# Patient Record
Sex: FEMALE | Race: WHITE | Employment: PART TIME | URBAN - METROPOLITAN AREA
[De-identification: names, ages, dates, MRNs, and addresses within clinical notes are randomized per-mention and may not be internally consistent; named-entity substitution may affect disease eponyms.]

---

## 2023-02-15 ENCOUNTER — HOSPITAL ENCOUNTER (EMERGENCY)
Age: 18
Discharge: HOME | End: 2023-02-16
Attending: EMERGENCY MEDICINE

## 2023-02-15 DIAGNOSIS — R07.82 INTERCOSTAL PAIN: Primary | ICD-10-CM

## 2023-02-15 LAB
ALBUMIN SERPL-MCNC: 3.6 G/DL (ref 3.5–5.2)
ALBUMIN/GLOBULIN RATIO: 1.2 (ref 1–2.5)
ALP SERPL-CCNC: 62 U/L (ref 35–104)
ALT SERPL-CCNC: 8 U/L (ref 5–33)
ANION GAP SERPL CALCULATED.3IONS-SCNC: 11 MMOL/L (ref 9–17)
AST SERPL-CCNC: 15 U/L
BILIRUB SERPL-MCNC: <0.1 MG/DL (ref 0.3–1.2)
BILIRUBIN URINE: NEGATIVE
BUN SERPL-MCNC: 8 MG/DL (ref 6–20)
CALCIUM SERPL-MCNC: 8.9 MG/DL (ref 8.6–10.4)
CASTS UA: NORMAL /LPF (ref 0–8)
CHLORIDE SERPL-SCNC: 102 MMOL/L (ref 98–107)
CO2 SERPL-SCNC: 22 MMOL/L (ref 20–31)
COLOR: YELLOW
CREAT SERPL-MCNC: 0.52 MG/DL (ref 0.5–0.9)
D DIMER BLD IA.RAPID-MCNC: 0.9 MG/L FEU
EPITHELIAL CELLS UA: NORMAL /HPF (ref 0–5)
FLUAV AG SPEC QL: NEGATIVE
FLUBV AG SPEC QL: NEGATIVE
GFR SERPL CREATININE-BSD FRML MDRD: >60 ML/MIN/1.73M2
GLUCOSE SERPL-MCNC: 66 MG/DL (ref 70–99)
GLUCOSE UR STRIP.AUTO-MCNC: NEGATIVE MG/DL
HCT VFR BLD AUTO: 34.2 % (ref 36.3–47.1)
HGB BLD-MCNC: 11.1 G/DL (ref 11.9–15.1)
KETONES UR STRIP.AUTO-MCNC: NEGATIVE MG/DL
LEUKOCYTE ESTERASE UR QL STRIP.AUTO: ABNORMAL
LIPASE SERPL-CCNC: 25 U/L (ref 13–60)
MAGNESIUM SERPL-MCNC: 1.9 MG/DL (ref 1.7–2.2)
MCH RBC QN AUTO: 30.7 PG (ref 25.2–33.5)
MCHC RBC AUTO-ENTMCNC: 32.5 G/DL (ref 28.4–34.8)
MCV RBC AUTO: 94.5 FL (ref 82.6–102.9)
NITRITE UR QL STRIP.AUTO: NEGATIVE
NRBC AUTOMATED: 0 PER 100 WBC
PDW BLD-RTO: 13.9 % (ref 11.8–14.4)
PLATELET # BLD AUTO: 349 K/UL (ref 138–453)
PMV BLD AUTO: 9.1 FL (ref 8.1–13.5)
POTASSIUM SERPL-SCNC: 3.6 MMOL/L (ref 3.7–5.3)
PROT SERPL-MCNC: 6.6 G/DL (ref 6.4–8.3)
PROT UR STRIP.AUTO-MCNC: 6.5 MG/DL (ref 5–8)
PROT UR STRIP.AUTO-MCNC: NEGATIVE MG/DL
RBC # BLD: 3.62 M/UL (ref 3.95–5.11)
RBC CLUMPS #/AREA URNS AUTO: NORMAL /HPF (ref 0–4)
SARS-COV-2 RDRP RESP QL NAA+PROBE: NOT DETECTED
SODIUM SERPL-SCNC: 135 MMOL/L (ref 135–144)
SPECIFIC GRAVITY UA: 1.01 (ref 1–1.03)
SPECIMEN DESCRIPTION: NORMAL
TROPONIN I SERPL DL<=0.01 NG/ML-MCNC: <6 NG/L (ref 0–14)
TURBIDITY: CLEAR
URINE HGB: NEGATIVE
UROBILINOGEN, URINE: NORMAL
WBC # BLD AUTO: 12.6 K/UL (ref 4.5–13.5)
WBC UA: NORMAL /HPF (ref 0–5)

## 2023-02-15 PROCEDURE — 87804 INFLUENZA ASSAY W/OPTIC: CPT

## 2023-02-15 PROCEDURE — 87086 URINE CULTURE/COLONY COUNT: CPT

## 2023-02-15 PROCEDURE — 83735 ASSAY OF MAGNESIUM: CPT

## 2023-02-15 PROCEDURE — 85379 FIBRIN DEGRADATION QUANT: CPT

## 2023-02-15 PROCEDURE — 87635 SARS-COV-2 COVID-19 AMP PRB: CPT

## 2023-02-15 PROCEDURE — 85027 COMPLETE CBC AUTOMATED: CPT

## 2023-02-15 PROCEDURE — 80053 COMPREHEN METABOLIC PANEL: CPT

## 2023-02-15 PROCEDURE — 99284 EMERGENCY DEPT VISIT MOD MDM: CPT

## 2023-02-15 PROCEDURE — 81001 URINALYSIS AUTO W/SCOPE: CPT

## 2023-02-15 PROCEDURE — 84484 ASSAY OF TROPONIN QUANT: CPT

## 2023-02-15 PROCEDURE — 83690 ASSAY OF LIPASE: CPT

## 2023-02-15 PROCEDURE — 93005 ELECTROCARDIOGRAM TRACING: CPT

## 2023-02-15 ASSESSMENT — PAIN SCALES - GENERAL: PAINLEVEL_OUTOF10: 5

## 2023-02-15 ASSESSMENT — ENCOUNTER SYMPTOMS
CHEST TIGHTNESS: 0
COUGH: 1
ABDOMINAL PAIN: 0
SORE THROAT: 0
VOMITING: 0
DIARRHEA: 0
NAUSEA: 0
SHORTNESS OF BREATH: 0

## 2023-02-15 ASSESSMENT — PAIN - FUNCTIONAL ASSESSMENT: PAIN_FUNCTIONAL_ASSESSMENT: 0-10

## 2023-02-15 ASSESSMENT — PAIN DESCRIPTION - LOCATION: LOCATION: CHEST

## 2023-02-16 VITALS
HEART RATE: 73 BPM | SYSTOLIC BLOOD PRESSURE: 91 MMHG | RESPIRATION RATE: 17 BRPM | HEIGHT: 67 IN | TEMPERATURE: 97 F | DIASTOLIC BLOOD PRESSURE: 73 MMHG | OXYGEN SATURATION: 99 %

## 2023-02-16 LAB
EKG ATRIAL RATE: 85 BPM
EKG P AXIS: 19 DEGREES
EKG P-R INTERVAL: 144 MS
EKG Q-T INTERVAL: 374 MS
EKG QRS DURATION: 68 MS
EKG QTC CALCULATION (BAZETT): 445 MS
EKG R AXIS: 16 DEGREES
EKG T AXIS: 20 DEGREES
EKG VENTRICULAR RATE: 85 BPM
MICROORGANISM SPEC CULT: NORMAL
SPECIMEN DESCRIPTION: NORMAL

## 2023-02-16 PROCEDURE — 93010 ELECTROCARDIOGRAM REPORT: CPT | Performed by: INTERNAL MEDICINE

## 2023-02-16 NOTE — ED PROVIDER NOTES
[facility]  Emergency Department  Faculty Attestation     I performed a history and physical examination of the patient and discussed management with the resident. I reviewed the residents note and agree with the documented findings and plan of care. Any areas of disagreement are noted on the chart. I was personally present for the key portions of any procedures. I have documented in the chart those procedures where I was not present during the key portions. I have reviewed the emergency nurses triage note. I agree with the chief complaint, past medical history, past surgical history, allergies, medications, social and family history as documented unless otherwise noted below. For Physician Assistant/ Nurse Practitioner cases/documentation I have personally evaluated this patient and have completed at least one if not all key elements of the E/M (history, physical exam, and MDM). Additional findings are as noted.       Primary Care Physician:  Crow Veras MD    Screenings:  [address]       Chief Complaint   Patient presents with    Chest Pain    Shortness of Breath    Other     23 weeks pregnant        RECENT VITALS:   Temp: 97 °F (36.1 °C),  Heart Rate: 64, Resp: 18, BP: 91/73    LABS:  Labs Reviewed   CBC - Abnormal; Notable for the following components:       Result Value    RBC 3.62 (*)     Hemoglobin 11.1 (*)     Hematocrit 34.2 (*)     All other components within normal limits   COMPREHENSIVE METABOLIC PANEL - Abnormal; Notable for the following components:    Glucose 66 (*)     Potassium 3.6 (*)     Total Bilirubin <0.1 (*)     All other components within normal limits   URINALYSIS WITH REFLEX TO CULTURE - Abnormal; Notable for the following components:    Leukocyte Esterase, Urine SMALL (*)     All other components within normal limits   COVID-19, RAPID   RAPID INFLUENZA A/B ANTIGENS   CULTURE, URINE   TROPONIN   LIPASE   MAGNESIUM D-DIMER, QUANTITATIVE   MICROSCOPIC URINALYSIS   TROPONIN       Radiology  No orders to display       CRITICAL CARE: There was a high probability of clinically significant/life threatening deterioration in this patient's condition which required my urgent intervention. Total critical care time was none minutes. This excludes any time for separately reportable procedures. EKG:  EKG Interpretation    Interpreted by me    Rhythm: normal sinus   Rate: normal  Axis: normal  Ectopy: none  Conduction: normal  ST Segments: no acute change  T Waves: Inversion V2  Q Waves: none    Clinical Impression: Nonspecific T wave abnormality    Attending Physician Additional  Notes    Patient has right anterior nonradiating chest discomfort constant nature but worse with breathing since 715 this morning. There is no cough sputum hemoptysis. No fever chills or sweats. 3 weeks pregnant. She has right calf pain and was told that since the Doppler is negative that she had a muscle strain but does not recall an injury. No prior DVT/PE. No trauma. She does have some mild GERD controlled by PPI. She is not taking Tylenol for pain. On exam she is nontoxic afebrile vital signs are normal.  Lungs are clear. Chest has mild well localized tenderness along the anterior chest which reproduces her pain. Abdomen is soft and nontender. There is right calf tenderness without edema or cords. Left side is normal.  Impression is chest pain, consider costochondritis, muscle strain, less likely pleurisy pulmonary embolism cardiac issue. Plan is chest x-ray, laboratory studies, D-dimer, Tylenol, reassess. Will CT chest for PE if D-dimer elevated. Patient is refusing chest x-ray, is reluctant to get radiation exposure. Even after reassurance by the resident and offloading of lead cover patient still refuses. Impression is likely musculoskeletal chest wall pain.   Plan is Tylenol, consider lidocaine patch though the patient initially refused this. Return precautions. Laura Gomez.  Lorraine Ivey MD, [address]  Attending Emergency  Physician                Jasper Winters MD  02/15/23 [id number]       Jasper Winters MD  02/16/23 Phuong Souza

## 2023-02-16 NOTE — ED NOTES
Pt reports to the ED with complaints of chest pain and SOB x1 day. Pt states she was at work when the symptoms started at about 1930. Pt states she is 23 weeks pregnant and this is her first pregnancy. Pt denies vaginal bleeding and contractions at this time. Pt states she has felt good fetal movement today but does state she started having cramping a few days ago. Pt states she has seen OB and is taking prenatals. Pt states EMS went to her house to evaluate her PTA. Pt does not have any other complaints at this time. Pt is A&O x4 and speaking in complete sentences. Pt is resting in bed comfortably, NAD noted. Pt denies urinary symptoms. EKG obtained. Pt placed on full cardiac monitor       Reyna Chino RN  02/16/23 0000

## 2023-02-16 NOTE — DISCHARGE INSTRUCTIONS
Call today or tomorrow to follow up with Anderson Alvarez MD  in 7 days. You had a positive result of leukocyte esterase in your urinalysis. This could be indicative of a condition called asymptomatic bacteriuria and may warrant you following up with your obstetrician and gynecologist.  Please take the time to call and book an appointment for further follow-up. Please take all medications as prescribed. You can also use ibuprofen or Tylenol (unless prescribed medications that have Tylenol in it) for pain. You can take over the counter Ibuprofen (advil) tablets (4 every 8 hours or 3 every 6 hours or 2 every 4 hours)      Please return to the Emergency Department if you develop any symptoms that concern you, including the following: increasing pain, shortness of breath, swelling to your feet, unable to lay flat, vomiting, fevers, numbness, weakness, loss of bladder/bowel control, loss of consciousness or any other concerns.

## 2023-02-16 NOTE — ED PROVIDER NOTES
[address] ED  Emergency Department Encounter  Emergency Medicine Resident     Pt Sid Donis  MRN: [id number]  [place] 2005  Date of evaluation: 2/15/23  PCP:  Rinku Bah MD  Note Started: 10:07 PM EST      CHIEF COMPLAINT       Chief Complaint   Patient presents with    Chest Pain    Shortness of Breath    Other     23 weeks pregnant        HISTORY OF PRESENT ILLNESS  (Location/Symptom, Timing/Onset, Context/Setting, Quality, Duration, Modifying Factors, Severity.)      Randy Curiel is a 21 y.o. female who presents with pain. The patient states her chest pain began at work at around 7:20 PM.  Patient states she was standing up and working on the [employer], as a  at the [employer]. She rates her pain as a 5 out of 10 and states that it has come and gone episodes with approximately 1 to 2 seconds of breaks in between. There is no radiation of the pain remains located on the right side of her chest near her right clavicle. She has never had a prior episode similar to this. She also reports some dizziness and right headedness with no vision loss. .  The patient is 23 weeks pregnant, recently moved to the area but has been following up with her physician with appropriate prenatal care. She reports that she has no shortness of breath, no abdominal pain, no discharge from her vagina of either blood or other fluids. She reports no numbness in her extremities. She does state however that she has had some mild cramping over the past couple of days. Lastly, she states that she has had a mild cough since December when her boyfriend had a cold. PAST MEDICAL / SURGICAL / SOCIAL / FAMILY HISTORY      has no past medical history on file. Brooke Rash     has no past surgical history on file.   omeprazole    Social History     Socioeconomic History    Marital status: Single     Spouse name: Not on file    Number of children: Not on file    Years of education: Not on file    Highest education level: Not on file   Occupational History    Not on file   Tobacco Use    Smoking status: Never    Smokeless tobacco: Never   Vaping Use    Vaping Use: Not on file   Substance and Sexual Activity    Alcohol use: Not Currently    Drug use: Not on file    Sexual activity: Not on file   Other Topics Concern    Not on file   Social History Narrative    Not on file     Social Determinants of Health     Financial Resource Strain: Not on file   Food Insecurity: Not on file   Transportation Needs: Not on file   Physical Activity: Not on file   Stress: Not on file   Social Connections: Not on file   Intimate Partner Violence: Not on file   Housing Stability: Not on file       History reviewed. No pertinent family history. Allergies:  Patient has no known allergies. Home Medications:  Prior to Admission medications    Medication Sig Start Date End Date Taking? Authorizing Provider   Prenatal Vit-Fe Fumarate-FA (PRENATAL 1+1 PO) Take by mouth   Yes Historical Provider, MD         REVIEW OF SYSTEMS       Review of Systems   Constitutional:  Negative for activity change, appetite change, chills and fever. HENT:  Negative for sore throat. Eyes:  Negative for visual disturbance. Respiratory:  Positive for cough. Negative for chest tightness and shortness of breath. Cardiovascular:  Positive for chest pain. Negative for palpitations. Gastrointestinal:  Negative for abdominal pain, diarrhea, nausea and vomiting. Skin:  Negative for wound. Allergic/Immunologic: Negative for environmental allergies and food allergies. Neurological:  Positive for dizziness and light-headedness. Negative for weakness. Psychiatric/Behavioral:  Negative for self-injury and suicidal ideas.       PHYSICAL EXAM      INITIAL VITALS:   BP 91/73   Pulse 73   Temp 97 °F (36.1 °C) (Oral)   Resp 17   Ht 5' 7\" (1.702 m)   LMP  (LMP Unknown)   SpO2 99%     Physical Exam  Constitutional:       General: She is not in acute distress. Appearance: She is normal weight. Eyes:      Extraocular Movements: Extraocular movements intact. Pupils: Pupils are equal, round, and reactive to light. Cardiovascular:      Rate and Rhythm: Normal rate and regular rhythm. Pulses: Normal pulses. Heart sounds: Normal heart sounds. Pulmonary:      Breath sounds: No decreased breath sounds, wheezing, rhonchi or rales. Chest:      Chest wall: Tenderness (right sided, at clavicle) present. Abdominal:      General: Bowel sounds are normal.      Palpations: Abdomen is soft. Musculoskeletal:      Right lower leg: No tenderness. No edema. Left lower leg: No tenderness. No edema. Skin:     General: Skin is warm and dry. Capillary Refill: Capillary refill takes less than 2 seconds. Neurological:      General: No focal deficit present. Mental Status: She is alert and oriented to person, place, and time. Psychiatric:         Mood and Affect: Mood is anxious. Behavior: Behavior normal.         DDX/DIAGNOSTIC RESULTS / EMERGENCY DEPARTMENT COURSE / MDM     Medical Decision Making  Is a 64-year-old female that is 23 weeks pregnant presenting with right-sided chest pain as well as a chronic cough. Patient has notable past medical history of GERD and was not compliant with her medications today. Further, she has a remote history of anxiety and panic attacks but states this is not similar to her panic attacks. 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 However, it is important to also work-up the patient for possible cardiac, pulmonary, and abdominal causes. As such labs for cardiac work-up, PE, as well as cholecystitis and liver function will be placed.     Amount and/or Complexity of Data Reviewed  Independent Historian: parent     Details: States patient has hx of depression and Anxiety  Labs: ordered. Decision-making details documented in ED Course. Radiology:  Decision-making details documented in ED Course. ECG/medicine tests: ordered. Decision-making details documented in ED Course. Risk  OTC drugs. Prescription drug management. Critical Care  Total time providing critical care: < 30 minutes  Chart review:  Currently taking prenatals  Vitals stable on arrival  No covid/flu/DTAP/Tdap    No prior admissions in our system    EKG  Normal sinus rhythm  Normal EKG    All EKG's are interpreted by the Emergency Department Physician who either signs or Co-signs this chart in the absence of a cardiologist.    EMERGENCY DEPARTMENT COURSE:      ED Course as of 02/16/23 0656   Wed Feb 15, 2023   2235 Ddx: MSK pain, ACS, PE, Cholecystitis, GERD [MZ]   2236 Will order covid and flu given reported mild cough   [MZ]   2236 Labs otherwise negative  Patient does not wish to have xray   Otherwise stable, likely msk pain  [MZ]   2345 Pt reports right calf pain   [MZ]   2347 BILIRUBIN TOTAL(!): <0.1 [MZ]   2347 Lipase: 25 [MZ]   2347 LFTs and lipase normal [MZ]   2347 Troponin, High Sensitivity: <6  normal [MZ]   2347 D-Dimer, Quant: 0.90  PE excluded via years criteria [MZ]      ED Course User Index  [MZ] Kiana Sykes MD       PROCEDURES:  none    CONSULTS:  None    CRITICAL CARE:  There was significant risk of life threatening deterioration of patient's condition requiring my direct management. Critical care time 0 minutes, excluding any documented procedures. FINAL IMPRESSION      1.  Intercostal pain          DISPOSITION / PLAN     DISPOSITION Decision To Discharge 02/16/2023 12:04:00 AM      PATIENT REFERRED TO:  Jose Jenkins MD  [address] [facility] [phone]    In 1 week  As needed    [address] [address] [address]  [phone]  Schedule an appointment as soon as possible for a visit   As needed    DISCHARGE MEDICATIONS:  Discharge Medication List as of 2/16/2023 12:11 AM          Jolly Valadez MD  Emergency Medicine Resident    (Please note that portions of thisnote were completed with a voice recognition program.  Efforts were made to edit the dictations but occasionally words are mis-transcribed.)       Jolly Valadez MD  Resident  02/16/23 3499

## 2023-02-16 NOTE — ED NOTES
Dr. Ankita Weeks at bedside with [address] [facility], [address] [place] [address]  02/15/23 [id number]

## 2023-02-16 NOTE — ED NOTES
The following labs were labeled with appropriate pt sticker and tubed to lab:     [] Blue     [] Lavender   [] on ice  [] Green/yellow  [] Green/black [] on ice  [] Alhaji Dunks  [] on ice  [] Yellow  [] Red  [] Type/ Screen  [] ABG  [] VBG    [x] COVID-19 swab    [x] Rapid  [] PCR  [x] Flu swab  [] Peds Viral Panel     [] Urine Sample  [] Fecal Sample  [] Pelvic Cultures  [] Blood Cultures  [] X 2  [] STREP Cultures         Bandar Mtz RN  02/15/23 1322

## 2023-02-16 NOTE — ED NOTES
The following labs were labeled with appropriate pt sticker and tubed to lab:     [x] Blue     [x] Lavender   [] on ice  [x] Green/yellow  [] Green/black [] on ice  [] Henery Lei  [] on ice  [] Yellow  [x] Red  [] Type/ Screen  [] ABG  [] VBG    [] COVID-19 swab    [] Rapid  [] PCR  [] Flu swab  [] Peds Viral Panel     [x] Urine Sample  [] Fecal Sample  [] Pelvic Cultures  [] Blood Cultures  [] X 2  [] STREP Cultures         Torres Avitia RN  02/15/23 3547